# Patient Record
Sex: FEMALE | Race: WHITE | NOT HISPANIC OR LATINO | URBAN - METROPOLITAN AREA
[De-identification: names, ages, dates, MRNs, and addresses within clinical notes are randomized per-mention and may not be internally consistent; named-entity substitution may affect disease eponyms.]

---

## 2023-09-23 PROCEDURE — 99284 EMERGENCY DEPT VISIT MOD MDM: CPT

## 2023-09-24 ENCOUNTER — HOSPITAL ENCOUNTER (EMERGENCY)
Facility: HOSPITAL | Age: 38
Discharge: HOME | End: 2023-09-24
Attending: EMERGENCY MEDICINE
Payer: MEDICAID

## 2023-09-24 VITALS
RESPIRATION RATE: 18 BRPM | DIASTOLIC BLOOD PRESSURE: 85 MMHG | HEART RATE: 101 BPM | TEMPERATURE: 99 F | OXYGEN SATURATION: 97 % | SYSTOLIC BLOOD PRESSURE: 138 MMHG

## 2023-09-24 DIAGNOSIS — L03.114 CELLULITIS OF LEFT UPPER EXTREMITY: Primary | ICD-10-CM

## 2023-09-24 DIAGNOSIS — R00.0 TACHYCARDIA: ICD-10-CM

## 2023-09-24 PROCEDURE — 93010 ELECTROCARDIOGRAM REPORT: CPT | Mod: ,,, | Performed by: INTERNAL MEDICINE

## 2023-09-24 PROCEDURE — 93010 EKG 12-LEAD: ICD-10-PCS | Mod: ,,, | Performed by: INTERNAL MEDICINE

## 2023-09-24 PROCEDURE — 93005 ELECTROCARDIOGRAM TRACING: CPT

## 2023-09-24 PROCEDURE — 25000003 PHARM REV CODE 250: Performed by: EMERGENCY MEDICINE

## 2023-09-24 RX ORDER — BACITRACIN ZINC 500 [USP'U]/G
OINTMENT TOPICAL
Status: COMPLETED | OUTPATIENT
Start: 2023-09-24 | End: 2023-09-24

## 2023-09-24 RX ORDER — IBUPROFEN 600 MG/1
600 TABLET ORAL
Status: COMPLETED | OUTPATIENT
Start: 2023-09-24 | End: 2023-09-24

## 2023-09-24 RX ORDER — SULFAMETHOXAZOLE AND TRIMETHOPRIM 800; 160 MG/1; MG/1
1 TABLET ORAL
Status: COMPLETED | OUTPATIENT
Start: 2023-09-24 | End: 2023-09-24

## 2023-09-24 RX ORDER — CEPHALEXIN 500 MG/1
500 CAPSULE ORAL
Status: COMPLETED | OUTPATIENT
Start: 2023-09-24 | End: 2023-09-24

## 2023-09-24 RX ORDER — SULFAMETHOXAZOLE AND TRIMETHOPRIM 800; 160 MG/1; MG/1
1 TABLET ORAL 2 TIMES DAILY
Qty: 14 TABLET | Refills: 0 | Status: SHIPPED | OUTPATIENT
Start: 2023-09-24 | End: 2023-10-04

## 2023-09-24 RX ORDER — CEPHALEXIN 500 MG/1
500 CAPSULE ORAL EVERY 6 HOURS
Qty: 40 CAPSULE | Refills: 0 | Status: SHIPPED | OUTPATIENT
Start: 2023-09-24 | End: 2023-10-04

## 2023-09-24 RX ADMIN — SULFAMETHOXAZOLE AND TRIMETHOPRIM 1 TABLET: 800; 160 TABLET ORAL at 12:09

## 2023-09-24 RX ADMIN — CEPHALEXIN 500 MG: 500 CAPSULE ORAL at 12:09

## 2023-09-24 RX ADMIN — Medication 1 EACH: at 12:09

## 2023-09-24 RX ADMIN — IBUPROFEN 600 MG: 600 TABLET ORAL at 12:09

## 2023-09-24 NOTE — ED NOTES
"Bebe Allen, a 38 y.o. female presents to the ED w/ complaint of lleft arm pain/swelling. Reports her house cat bit her there 1 week ago and it's been growing in redness/pain.swelling since. Today she "popped it" and it started draining blood and pus. Denies fever. Denies hx of similar symptoms. HIV+    Patient reports she was diagnosed with scabies last week due to a rash on her hands but is not taking the medication, doesn't think she has scabies.     Triage note:  Chief Complaint   Patient presents with    Abscess     Abscess to L arm x1 week that burst today. States it is from a cat bite. Possible scabies infection, states went to  for rash and was Dx with scabies, has not been taking medication for it because pt states "I do not have scabies."     Review of patient's allergies indicates:   Allergen Reactions    Penicillins      Past Medical History:   Diagnosis Date    Herpes     HIV disease            LOC: The patient is awake, alert, and oriented to self, place, time, and situation. Pt is calm and cooperative. Affect is anxious. 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    APPEARANCE: Patient resting comfortably in no acute distress.  Patient is somewhat disheveled.    SKIN: Left radial elbow with area of swelling, erythema, raised skin 2x2in. In the center there is an ulcerated region with reddish pink skin and purulence. The floor next to the patient has several large drops of blood, patient reports she just tried to "pop" the lesion. Otherwise, the visualized skin is warm and dry; color consistent with ethnicity.  Patient has normal skin turgor and moist mucus membranes.  Skin intact; no breakdown or bruising noted.     MUSCULOSKELETAL: Patient moving upper and lower extremities without difficulty; denies pain in the extremities or back.  Denies weakness.     RESPIRATORY: Airway is open and " patent. Respirations spontaneous, even, easy, and non-labored.  No audible wheeze. Patient has a normal effort and rate.  No accessory muscle use noted. Denies cough.     CARDIAC:  Tachycardia noted.  No peripheral edema noted. 2+ radial pulses bilaterally. No complaints of chest pain.      ABDOMEN: Supple.  No distention noted. Pt denies abdominal pain; denies nausea, vomiting, diarrhea, or constipation.    NEUROLOGIC: Eyes open spontaneously.  Behavior somewhat odd.  Follows commands; facial expression symmetrical.  Purposeful motor response noted; normal sensation in all extremities. Pt denies headache; denies lightheadedness or dizziness; denies visual disturbances; denies loss of balance; denies unilateral weakness.

## 2023-09-24 NOTE — Clinical Note
"Bebe Fuentes"Alejandro was seen and treated in our emergency department on 9/23/2023.  She may return to school on 09/25/2023.      If you have any questions or concerns, please don't hesitate to call.      Christen Gupta RN"

## 2023-09-24 NOTE — ED PROVIDER NOTES
"History:  Bebe Allen is a 38 y.o. female who presents to the ED with Abscess (Abscess to L arm x1 week that burst today. States it is from a cat bite. Possible scabies infection, states went to  for rash and was Dx with scabies, has not been taking medication for it because pt states "I do not have scabies.")    Described as 38-year-old female with a history of HIV, depression, anxiety, opioid dependence presenting to the emergency department with the wound.  She reports she was seen for the same at urgent care but was not given antibiotics.  She believed it to be an abscess, and she used a razor blade at home to cut it open.  She reports that it did not drain, only blood, until she stuck a needle in it, and reports it drain a significant amount of yellow pus.  She reports it then felt better until today when she began to have increased redness around the wound with increased pain.  Regarding her cat bite, she had a cat bite on her opposite arm a week ago, took Keflex, and has no issues from that.  She also reports that she had 1 bump on her webspace in urgent care told her she had scabies.    Review of Systems: All systems reviewed and are negative except as per history of present illness.    Medications:   Previous Medications    ACID REDUCER, FAMOTIDINE, 10 MG TABLET    Take 10 mg by mouth 2 (two) times daily.    AZITHROMYCIN (Z-KASIE) 250 MG TABLET    Take 1 tablet (250 mg total) by mouth once daily. 1 pack, take as directed    AZITHROMYCIN (Z-KASIE) 250 MG TABLET    Take 1 tablet (250 mg total) by mouth once daily. 1 pack, take as directed    AZITHROMYCIN (Z-KASIE) 250 MG TABLET    TAKE BY MOUTH AS DIRECTED    AZITHROMYCIN (Z-KASIE) 250 MG TABLET    TAKE 2 TABLETS BY MOUTH FOR 1 DAY THEN TAKE 1 TABLET BY MOUTH DAILY FOR 4 DAYS    BUPRENORPHINE-NALOXONE (SUBOXONE) 12-3 MG FILM    DISSOLVE 1 FILM UNDER THE TONGUE TWICE DAILY    BUPROPION (WELLBUTRIN XL) 150 MG TB24 TABLET    Take 1 tablet (150 mg total) by " mouth once daily.    BUPROPION (WELLBUTRIN XL) 150 MG TB24 TABLET    TAKE 1 TABLET BY MOUTH EVERY DAY    BUPROPION (WELLBUTRIN XL) 150 MG TB24 TABLET    Take 1 tablet (150 mg total) by mouth once daily.    BUPROPION (WELLBUTRIN XL) 300 MG 24 HR TABLET    TAKE 1 TABLET BY MOUTH EVERY DAY    BUPROPION (WELLBUTRIN XL) 300 MG 24 HR TABLET    Take 1 tablet (300 mg total) by mouth once daily.    BUPROPION (WELLBUTRIN XL) 300 MG 24 HR TABLET    Take 1 tablet (300 mg total) by mouth once daily.    BUPROPION (WELLBUTRIN XL) 300 MG 24 HR TABLET    Take 1 tablet (300 mg total) by mouth once daily.    BUPROPION (WELLBUTRIN XL) 300 MG 24 HR TABLET    Take 1 tablet (300 mg total) by mouth once daily.    CLONIDINE (CATAPRES) 0.1 MG TABLET    Take 1 tablet (0.1 mg total) by mouth 2 (two) times daily.    CONCEPT DHA 35-1-200 MG CAP    Take 1 capsule by mouth once daily.    CYCLOBENZAPRINE (FLEXERIL) 10 MG TABLET    Take 1 tablet (10 mg total) by mouth every evening.    DIAZEPAM (VALIUM) 10 MG TAB    Take 1 tablet (10 mg total) by mouth every 12 (twelve) hours as needed for Anxiety.    DOXYCYCLINE (VIBRA-TABS) 100 MG TABLET        FLUCONAZOLE (DIFLUCAN) 150 MG TAB    take 1 tablet by mouth as a single dose    FLUOXETINE (PROZAC) 20 MG CAPSULE    Take 20 mg by mouth once daily.    FLUTICASONE (FLONASE) 50 MCG/ACTUATION NASAL SPRAY    1 spray by Each Nare route once daily.    GABAPENTIN (NEURONTIN) 100 MG CAPSULE    TAKE 1 CAPSULE(100 MG) BY MOUTH THREE TIMES DAILY    GABAPENTIN (NEURONTIN) 400 MG CAPSULE    TAKE 1 CAPSULE(400 MG) BY MOUTH THREE TIMES DAILY    HYDROCORTISONE 2.5 % OINTMENT    Apply topically 2 (two) times daily. To affected area    HYDROXYZINE (VISTARIL) 100 MG CAPSULE    TAKE 1 CAPSULE BY MOUTH 2 TIMES A DAY AS NEEDED FOR ITCHING    IVERMECTIN 1 % CREA    Apply 1 application topically once daily at 6am.    KALETRA 200-50 MG TAB        LAMIVUDINE-ZIDOVUDINE 150-300MG (COMBIVIR) 150-300 MG TAB        LORATADINE  (CLARITIN) 10 MG TABLET    Take 1 tablet (10 mg total) by mouth once daily.    METRONIDAZOLE (FLAGYL) 500 MG TABLET        MUPIROCIN (BACTROBAN) 2 % OINTMENT    Apply topically 2 (two) times daily. AAA    NORVIR 100 MG TAB TABLET    Take 100 mg by mouth once daily.    ONDANSETRON (ZOFRAN) 4 MG TABLET    Take 4 mg by mouth 3 (three) times daily.    PRENAISSANCE 90 DHA 90 MG IRON-1 MG -50 MG-300 MG CMPK        PROMETHAZINE (PHENERGAN) 25 MG TABLET        PROMETHAZINE (PHENERGAN) 25 MG TABLET    Take 1 tablet (25 mg total) by mouth every 8 (eight) hours as needed for Nausea.    PROMETHAZINE (PHENERGAN) 6.25 MG/5 ML SYRUP    take 20 milliliters (4 teaspoonfuls) by mouth once daily    RANITIDINE (ZANTAC) 300 MG TABLET    Take 1 tablet (300 mg total) by mouth 2 (two) times daily.    REYATAZ 300 MG CAP        SERTRALINE (ZOLOFT) 50 MG TABLET        TRUVADA 200-300 MG TAB        VALACYCLOVIR (VALTREX) 1000 MG TABLET    Take 1,000 mg by mouth once daily.    VALACYCLOVIR (VALTREX) 500 MG TABLET    Take 500 mg by mouth 2 (two) times daily.       PMH:   Past Medical History:   Diagnosis Date    Herpes     HIV disease      PSH: No past surgical history on file.  Allergies: She is allergic to penicillins.  Social History: Marital Status: single. She  reports that she has been smoking. She does not have any smokeless tobacco history on file.. She  reports no history of alcohol use..       Exam:  VITAL SIGNS:   Vitals:    09/23/23 2304   BP: 132/82   BP Location: Right arm   Patient Position: Sitting   Pulse: (!) 117   Resp: 18   Temp: 97.5 °F (36.4 °C)   TempSrc: Oral   SpO2: 97%     Const: Awake and alert, NAD   Head: Atraumatic  Eyes: Normal Conjunctiva  ENT: Normal External Ears, Nose and Mouth.  Neck: Full range of motion. No meningismus.  Resp: Normal respiratory effort, No distress  Cardio: Equal and intact distal pulses  Skin:  Erythema and warmth surrounding a 1 cm shallow ulceration on left proximal forearm.  No  induration or fluctuance, no active drainage.  Slight oozing blood from her shallow skin around the wound  Ext: No cyanosis, or edema.  Full range of motion at elbow  Neur: Awake and alert  Psych:  Anxious appearing    Medical Decision Makin-year-old female presenting to the emergency department with a wound.  She does have a cellulitis surrounding what was likely an abscess that she drained by herself at home.  She was encouraged to continue warm compresses to continue to encourage drainage if need be.  There is no signs of abscess on examination today.  She was no systemic symptoms.  She will be prescribed Keflex and Bactrim for her cellulitis.  Her tachycardia significantly improved, though she remained anxious and subsequently was upset regarding a different personal issue prior to discharge.  She does not meet criteria for pec at this time.  She was encouraged to keep the wound clean, dry, and covered, bacitracin as needed, follow up with PCP for wound check in 2 days.    Clinical Impression:  1. Cellulitis of left upper extremity    2. Tachycardia             Medication List        CHANGE how you take these medications      cephALEXin 500 MG capsule  Commonly known as: KEFLEX  Take 1 capsule (500 mg total) by mouth every 6 (six) hours. for 10 days  What changed:   how much to take  how to take this  when to take this     sulfamethoxazole-trimethoprim 800-160mg 800-160 mg Tab  Commonly known as: BACTRIM DS  Take 1 tablet by mouth 2 (two) times daily. for 10 days  What changed: when to take this            ASK your doctor about these medications      ACID REDUCER (FAMOTIDINE) 10 MG tablet  Generic drug: famotidine     * azithromycin 250 MG tablet  Commonly known as: Z-KASIE  Take 1 tablet (250 mg total) by mouth once daily. 1 pack, take as directed     * azithromycin 250 MG tablet  Commonly known as: Z-KASIE  Take 1 tablet (250 mg total) by mouth once daily. 1 pack, take as directed     * azithromycin 250 MG  tablet  Commonly known as: Z-KASIE  TAKE BY MOUTH AS DIRECTED     * azithromycin 250 MG tablet  Commonly known as: Z-KASIE  TAKE 2 TABLETS BY MOUTH FOR 1 DAY THEN TAKE 1 TABLET BY MOUTH DAILY FOR 4 DAYS     buprenorphine-naloxone 12-3 mg Film  Commonly known as: SUBOXONE  DISSOLVE 1 FILM UNDER THE TONGUE TWICE DAILY     * buPROPion 150 MG TB24 tablet  Commonly known as: WELLBUTRIN XL  Take 1 tablet (150 mg total) by mouth once daily.     * buPROPion 150 MG TB24 tablet  Commonly known as: WELLBUTRIN XL  TAKE 1 TABLET BY MOUTH EVERY DAY     * buPROPion 300 MG 24 hr tablet  Commonly known as: WELLBUTRIN XL  TAKE 1 TABLET BY MOUTH EVERY DAY     * buPROPion 150 MG TB24 tablet  Commonly known as: WELLBUTRIN XL  Take 1 tablet (150 mg total) by mouth once daily.     * buPROPion 300 MG 24 hr tablet  Commonly known as: WELLBUTRIN XL  Take 1 tablet (300 mg total) by mouth once daily.     * buPROPion 300 MG 24 hr tablet  Commonly known as: WELLBUTRIN XL  Take 1 tablet (300 mg total) by mouth once daily.     * buPROPion 300 MG 24 hr tablet  Commonly known as: WELLBUTRIN XL  Take 1 tablet (300 mg total) by mouth once daily.     * buPROPion 300 MG 24 hr tablet  Commonly known as: WELLBUTRIN XL  Take 1 tablet (300 mg total) by mouth once daily.     cloNIDine 0.1 MG tablet  Commonly known as: CATAPRES  Take 1 tablet (0.1 mg total) by mouth 2 (two) times daily.     CONCEPT DHA 35-1-200 mg Cap  Generic drug: [id number] [id number]     cyclobenzaprine 10 MG tablet  Commonly known as: FLEXERIL  Take 1 tablet (10 mg total) by mouth every evening.     diazePAM 10 MG Tab  Commonly known as: VALIUM  Take 1 tablet (10 mg total) by mouth every 12 (twelve) hours as needed for Anxiety.     doxycycline 100 MG tablet  Commonly known as: VIBRA-TABS     fluconazole 150 MG Tab  Commonly known as: DIFLUCAN     FLUoxetine 20 MG capsule     fluticasone propionate 50 mcg/actuation nasal spray  Commonly known as: FLONASE  1 spray by Each Nare route  once daily.     * gabapentin 100 MG capsule  Commonly known as: NEURONTIN  TAKE 1 CAPSULE(100 MG) BY MOUTH THREE TIMES DAILY     * gabapentin 400 MG capsule  Commonly known as: NEURONTIN  TAKE 1 CAPSULE(400 MG) BY MOUTH THREE TIMES DAILY     hydrocortisone 2.5 % ointment  Apply topically 2 (two) times daily. To affected area     hydrOXYzine 100 MG capsule  Commonly known as: VISTARIL  TAKE 1 CAPSULE BY MOUTH 2 TIMES A DAY AS NEEDED FOR ITCHING     ivermectin 1 % Crea  Commonly known as: SOOLANTRA  Apply 1 application topically once daily at 6am.     KALETRA 200-50 mg Tab  Generic drug: lopinavir-ritonavir 200-50 mg     lamivudine-zidovudine 150-300mg 150-300 mg Tab  Commonly known as: CombiVIR     loratadine 10 mg tablet  Commonly known as: CLARITIN  Take 1 tablet (10 mg total) by mouth once daily.     metroNIDAZOLE 500 MG tablet  Commonly known as: FLAGYL     mupirocin 2 % ointment  Commonly known as: BACTROBAN  Apply topically 2 (two) times daily. AAA     NORVIR 100 mg Tab tablet  Generic drug: ritonavir     ondansetron 4 MG tablet  Commonly known as: ZOFRAN     PRENAISSANCE 90 DHA 90 mg iron-1 mg -50 mg-300 mg Cmpk  Generic drug: [id number]-iron cbn,[id number]-FA-[id number]-dha     * promethazine 25 MG tablet  Commonly known as: PHENERGAN     * promethazine 25 MG tablet  Commonly known as: PHENERGAN  Take 1 tablet (25 mg total) by mouth every 8 (eight) hours as needed for Nausea.     * promethazine 6.25 mg/5 mL syrup  Commonly known as: PHENERGAN  take 20 milliliters (4 teaspoonfuls) by mouth once daily     ranitidine 300 MG tablet  Commonly known as: ZANTAC  Take 1 tablet (300 mg total) by mouth 2 (two) times daily.     REYATAZ 300 MG Cap  Generic drug: atazanavir     sertraline 50 MG tablet  Commonly known as: ZOLOFT     TRUVADA 200-300 mg Tab  Generic drug: emtricitabine-tenofovir 200-300 mg     * valACYclovir 500 MG tablet  Commonly known as: VALTREX     * valACYclovir 1000 MG tablet  Commonly known as: VALTREX           *  This list has 19 medication(s) that are the same as other medications prescribed for you. Read the directions carefully, and ask your doctor or other care provider to review them with you.                   Where to Get Your Medications        These medications were sent to [id number] - [place] - [address]  AT [facility] [address]  [address] [place] [address]      Hours: 24-hours Phone: [phone]   cephALEXin 500 MG capsule  sulfamethoxazole-trimethoprim 800-160mg 800-160 mg Tab         Follow-up Information       Lefty Garcia MD. Schedule an appointment as soon as possible for a visit in 2 days.    Specialty: Family Medicine  Why: For wound re-check  Contact information:  [address] [facility] [place] [address] [phone]                               Modesta Contreras MD  09/24/23 0053

## 2024-05-07 ENCOUNTER — OFFICE VISIT (OUTPATIENT)
Dept: URGENT CARE | Facility: CLINIC | Age: 39
End: 2024-05-07
Payer: MEDICAID

## 2024-05-07 VITALS
HEART RATE: 80 BPM | HEIGHT: 66 IN | TEMPERATURE: 98 F | SYSTOLIC BLOOD PRESSURE: 129 MMHG | BODY MASS INDEX: 32.14 KG/M2 | OXYGEN SATURATION: 98 % | RESPIRATION RATE: 20 BRPM | DIASTOLIC BLOOD PRESSURE: 84 MMHG | WEIGHT: 200 LBS

## 2024-05-07 DIAGNOSIS — R05.9 COUGH, UNSPECIFIED TYPE: ICD-10-CM

## 2024-05-07 DIAGNOSIS — J02.9 SORE THROAT: ICD-10-CM

## 2024-05-07 DIAGNOSIS — J06.9 VIRAL URI: Primary | ICD-10-CM

## 2024-05-07 LAB
CTP QC/QA: YES
CTP QC/QA: YES
MOLECULAR STREP A: NEGATIVE
SARS-COV-2 AG RESP QL IA.RAPID: NEGATIVE

## 2024-05-07 PROCEDURE — 87811 SARS-COV-2 COVID19 W/OPTIC: CPT | Mod: QW,S$GLB,,

## 2024-05-07 PROCEDURE — 87651 STREP A DNA AMP PROBE: CPT | Mod: QW,S$GLB,,

## 2024-05-07 PROCEDURE — 99214 OFFICE O/P EST MOD 30 MIN: CPT | Mod: S$GLB,,,

## 2024-05-07 RX ORDER — FLUTICASONE PROPIONATE 50 MCG
1 SPRAY, SUSPENSION (ML) NASAL 2 TIMES DAILY
Qty: 16 G | Refills: 0 | Status: SHIPPED | OUTPATIENT
Start: 2024-05-07 | End: 2024-05-17

## 2024-05-07 RX ORDER — CETIRIZINE HYDROCHLORIDE 10 MG/1
10 TABLET ORAL DAILY
Qty: 30 TABLET | Refills: 0 | Status: SHIPPED | OUTPATIENT
Start: 2024-05-07 | End: 2024-06-06

## 2024-05-07 RX ORDER — BENZONATATE 200 MG/1
200 CAPSULE ORAL 3 TIMES DAILY PRN
Qty: 30 CAPSULE | Refills: 0 | Status: SHIPPED | OUTPATIENT
Start: 2024-05-07 | End: 2024-05-17

## 2024-05-07 RX ORDER — PROMETHAZINE HYDROCHLORIDE AND DEXTROMETHORPHAN HYDROBROMIDE 6.25; 15 MG/5ML; MG/5ML
5 SYRUP ORAL EVERY 4 HOURS PRN
Qty: 180 ML | Refills: 0 | Status: SHIPPED | OUTPATIENT
Start: 2024-05-07 | End: 2024-05-17

## 2024-05-07 RX ORDER — IPRATROPIUM BROMIDE 21 UG/1
1 SPRAY, METERED NASAL 2 TIMES DAILY
Qty: 30 ML | Refills: 0 | Status: SHIPPED | OUTPATIENT
Start: 2024-05-07 | End: 2024-05-17

## 2024-05-07 NOTE — LETTER
May 7, 2024      [facility] - [address]  [place] [address]  Phone: [phone]  Fax: [phone]       Patient: Bebe Allen   YOB: 1985  Date of Visit: 05/07/2024    To Whom It May Concern:    Heeln Allen  was at [facility] on 05/07/2024. The patient may return to work/school on 5/8/24 with no restrictions. If you have any questions or concerns, or if I can be of further assistance, please do not hesitate to contact me.    Sincerely,    Lisandra Beal PA-C

## 2024-05-07 NOTE — PATIENT INSTRUCTIONS
Please drink plenty of fluids.  Please get plenty of rest.  Please return here or go to the Emergency Department for any concerns or worsening of condition.  Use flonase and/or atrovent nasal spray twice daily and take daily zyrtec as directed for ten days. Use tessalon perles during the day and promethazine-dm at night for cough. Recommended taking vitamin D and zinc supplements for immune support.   Recommended for patient to drink hot tea with honey. Consider eating softer foods such as soup and broth for the next couple of days to prevent further throat irritation. Recommended for patient to refrain from acidic foods (such as tomatoes or caffeine) to prevent throat irritation for the next couple of days.   If you do not have Hypertension or any history of palpitations, it is ok to take over the counter Sudafed or Mucinex D or Allegra-D or Claritin-D or Zyrtec-D.  If you do take one of the above, it is ok to combine that with plain over the counter Mucinex or Allegra or Claritin or Zyrtec.  If for example you are taking Zyrtec -D, you can combine that with Mucinex, but not Mucinex-D.  If you are taking Mucinex-D, you can combine that with plain Allegra or Claritin or Zyrtec.   If you do have Hypertension or palpitations, it is safe to take Coricidin HBP for relief of sinus symptoms.  If not allergic, please take over the counter Tylenol (Acetaminophen) and/or Motrin (Ibuprofen) as directed for control of pain and/or fever.  Please follow up with your primary care doctor or specialist as needed.    If you  smoke, please stop smoking.

## 2024-05-07 NOTE — PROGRESS NOTES
"Subjective:      Patient ID: Bebe Allen is a 39 y.o. female.    Vitals:  height is 5' 6" (1.676 m) and weight is 90.7 kg (200 lb). Her oral temperature is 98 °F (36.7 °C). Her blood pressure is 129/84 and her pulse is 80. Her respiration is 20 and oxygen saturation is 98%.     Chief Complaint: Cough and Sore Throat    [age]-year-old female presents to the clinic today with chief complaint of sore throat, postnasal drip, dry cough, subclinical fever, body aches, chills, sweats. Symptoms started 2 days ago and have not improved.  Patient has not taken any medication for symptom relief.  Patient does have a history of HIV.  She states her daughter did have strep 2 weeks ago.  Denies any recent travel.  She does have a history of seasonal allergies. Denies hx of asthma. Denies numbness or tingling. Denies radiation of pain. Denies chest pain, shortness of breath, wheezing, abdominal pain, nausea, vomiting, diarrhea, or rashes.            Cough  This is a new problem. The current episode started in the past 7 days. The problem has been gradually worsening. Associated symptoms include chills, a fever, myalgias, nasal congestion and postnasal drip. Pertinent negatives include no chest pain, ear pain, eye redness, headaches, heartburn, rash, sore throat, shortness of breath or wheezing. She has tried nothing for the symptoms. There is no history of environmental allergies.       Constitution: Positive for chills, sweating and fever. Negative for activity change, appetite change, fatigue, generalized weakness and international travel in last 60 days.   HENT:  Positive for postnasal drip. Negative for ear pain, ear discharge, tinnitus, hearing loss, congestion, nosebleeds, foreign body in nose, sinus pain, sinus pressure, sore throat, trouble swallowing and voice change.    Neck: Negative for neck pain, neck stiffness and neck swelling.   Cardiovascular:  Negative for chest pain, leg swelling, palpitations and " sob on exertion.   Eyes:  Negative for eye discharge, eye itching, eye pain, eye redness, photophobia, vision loss, double vision and blurred vision.   Respiratory:  Positive for cough. Negative for chest tightness, sputum production, shortness of breath, wheezing and asthma.    Gastrointestinal:  Negative for abdominal pain, nausea, vomiting, constipation, diarrhea, bright red blood in stool and heartburn.   Endocrine: cold intolerance and heat intolerance.   Genitourinary:  Negative for dysuria, frequency, urgency, urine decreased, flank pain and hematuria.   Musculoskeletal:  Positive for muscle ache. Negative for pain, joint pain, joint swelling and back pain.   Skin:  Negative for rash, erythema, bruising and hives.   Allergic/Immunologic: Negative for environmental allergies, seasonal allergies, food allergies, eczema, asthma, hives, itching and sneezing.   Neurological:  Negative for dizziness, light-headedness, headaches, disorientation and altered mental status.   Psychiatric/Behavioral:  Negative for altered mental status, disorientation, confusion, agitation and nervous/anxious. The patient is not nervous/anxious.       Objective:     Physical Exam   Constitutional: She is oriented to person, place, and time. She appears well-developed. She is cooperative.  Non-toxic appearance. She does not appear ill. No distress.   HENT:   Head: Normocephalic and atraumatic.   Ears:   Right Ear: Hearing, external ear and ear canal normal. A middle ear effusion is present.   Left Ear: Hearing, external ear and ear canal normal. A middle ear effusion is present.   Nose: Mucosal edema present. No rhinorrhea, purulent discharge or nasal deformity. No epistaxis. Right sinus exhibits no maxillary sinus tenderness and no frontal sinus tenderness. Left sinus exhibits no maxillary sinus tenderness and no frontal sinus tenderness.   Mouth/Throat: Uvula is midline, oropharynx is clear and moist and mucous membranes are normal. No  trismus in the jaw. Normal dentition. No uvula swelling. Cobblestoning present. No oropharyngeal exudate, posterior oropharyngeal edema, posterior oropharyngeal erythema or tonsillar abscesses.   Eyes: Conjunctivae and lids are normal. No scleral icterus. Extraocular movement intact   Neck: Trachea normal and phonation normal. Neck supple. No edema present. No erythema present. No neck rigidity present.   Cardiovascular: Normal rate, regular rhythm, normal heart sounds and normal pulses.   Pulmonary/Chest: Effort normal and breath sounds normal. No stridor. No respiratory distress. She has no decreased breath sounds. She has no wheezes. She has no rhonchi. She has no rales.   Abdominal: Normal appearance.   Musculoskeletal: Normal range of motion.         General: No deformity. Normal range of motion.   Lymphadenopathy:     She has cervical adenopathy.   Neurological: She is alert and oriented to person, place, and time. She exhibits normal muscle tone. Coordination normal.   Skin: Skin is warm, dry, intact, not diaphoretic and not pale. No erythema   Psychiatric: Her speech is normal and behavior is normal. Judgment and thought content normal.   Nursing note and vitals reviewed.      Assessment:     1. Viral URI    2. Cough, unspecified type    3. Sore throat      Results for orders placed or performed in visit on 05/07/24   SARS Coronavirus 2 Antigen, POCT Manual Read   Result Value Ref Range    SARS Coronavirus 2 Antigen Negative Negative     Acceptable Yes    POCT Strep A, Molecular   Result Value Ref Range    Molecular Strep A, POC Negative Negative     Acceptable Yes        Plan:       Viral URI  -     ipratropium (ATROVENT) 21 mcg (0.03 %) nasal spray; 1 spray by Each Nostril route 2 (two) times daily. for 10 days  Dispense: 30 mL; Refill: 0  -     fluticasone propionate (FLONASE) 50 mcg/actuation nasal spray; 1 spray (50 mcg total) by Each Nostril route 2 (two) times daily. for  10 days  Dispense: 16 g; Refill: 0  -     cetirizine (ZYRTEC) 10 MG tablet; Take 1 tablet (10 mg total) by mouth once daily.  Dispense: 30 tablet; Refill: 0  -     benzonatate (TESSALON) 200 MG capsule; Take 1 capsule (200 mg total) by mouth 3 (three) times daily as needed for Cough.  Dispense: 30 capsule; Refill: 0  -     promethazine-dextromethorphan (PROMETHAZINE-DM) 6.25-15 mg/5 mL Syrp; Take 5 mLs by mouth every 4 (four) hours as needed (as needed).  Dispense: 180 mL; Refill: 0    Cough, unspecified type  -     SARS Coronavirus 2 Antigen, POCT Manual Read  -     POCT Strep A, Molecular  -     benzonatate (TESSALON) 200 MG capsule; Take 1 capsule (200 mg total) by mouth 3 (three) times daily as needed for Cough.  Dispense: 30 capsule; Refill: 0  -     promethazine-dextromethorphan (PROMETHAZINE-DM) 6.25-15 mg/5 mL Syrp; Take 5 mLs by mouth every 4 (four) hours as needed (as needed).  Dispense: 180 mL; Refill: 0    Sore throat  -     POCT Strep A, Molecular      We had shared decision making for patient's treatment. We discussed side effects/alternatives/benefits/risk and patient would like to proceed with treatment plan. We also discussed other OTC treatment recommendations. Patient was counseled, explained with the test results meaning, expected course, and answered all of questions. Patient can take OTC Acetaminophen (Tylenol) and/or Ibuprofen (Motrin) as needed for pain relief and/or fever relief. Continue to drink plenty of fluids. Follow up with PCP in the next 1-2 weeks as needed.  Gave patient strict ER/urgent care precautions in case symptoms worsen or if any new concerns arise.

## 2024-05-20 ENCOUNTER — OFFICE VISIT (OUTPATIENT)
Dept: URGENT CARE | Facility: CLINIC | Age: 39
End: 2024-05-20
Payer: MEDICAID

## 2024-05-20 VITALS
BODY MASS INDEX: 32.14 KG/M2 | HEIGHT: 66 IN | WEIGHT: 200 LBS | RESPIRATION RATE: 20 BRPM | OXYGEN SATURATION: 98 % | HEART RATE: 98 BPM | DIASTOLIC BLOOD PRESSURE: 71 MMHG | TEMPERATURE: 99 F | SYSTOLIC BLOOD PRESSURE: 118 MMHG
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
## 2024-05-20 DIAGNOSIS — S00.12XA: Primary | ICD-10-CM

## 2024-05-20 PROCEDURE — 99213 OFFICE O/P EST LOW 20 MIN: CPT | Mod: S$GLB,,, | Performed by: NURSE PRACTITIONER

## 2024-05-20 RX ORDER — DEXTROAMPHETAMINE SACCHARATE, AMPHETAMINE ASPARTATE, DEXTROAMPHETAMINE SULFATE AND AMPHETAMINE SULFATE 5; 5; 5; 5 MG/1; MG/1; MG/1; MG/1
1 TABLET ORAL
COMMUNITY
Start: 2024-05-17

## 2024-05-20 RX ORDER — DEXTROAMPHETAMINE SACCHARATE, AMPHETAMINE ASPARTATE, DEXTROAMPHETAMINE SULFATE AND AMPHETAMINE SULFATE 7.5; 7.5; 7.5; 7.5 MG/1; MG/1; MG/1; MG/1
1 TABLET ORAL 2 TIMES DAILY
COMMUNITY
Start: 2024-05-16

## 2024-05-20 RX ORDER — KETOROLAC TROMETHAMINE 30 MG/ML
30 INJECTION, SOLUTION INTRAMUSCULAR; INTRAVENOUS
Status: COMPLETED | OUTPATIENT
Start: 2024-05-20 | End: 2024-05-20

## 2024-05-20 RX ADMIN — KETOROLAC TROMETHAMINE 30 MG: 30 INJECTION, SOLUTION INTRAMUSCULAR; INTRAVENOUS at 05:05

## 2024-05-20 NOTE — PATIENT INSTRUCTIONS
DO NOT TAKE ANY MORE NAPROXEN OR IBUPROFEN FOR 24 HOURS AS YOU RECEIVED A LARGE DOSE OF IT VIA INJECTION     If your condition worsens or fails to improve we recommend that you receive another evaluation at the ER immediately or contact your PCP to discuss your concerns or return here. You must understand that you've received an urgent care treatment only and that you may be released before all your medical problems are known or treated. You the patient will arrange for followup care as instructed.    If you were prescribed antibiotics, please take them to completion.  If you were prescribed a narcotic medication, do not drive or operate heavy equipment or machinery while taking these medications.  Please follow up with your primary care doctor or specialist as needed.  If you  smoke, please stop smoking.

## 2024-05-20 NOTE — LETTER
May 20, 2024      [facility] [address]  [place] [address]  Phone: [phone]  Fax: [phone]       Patient: Bebe Allen   YOB: 1985  Date of Visit: 05/20/2024    To Whom It May Concern:    Helen Allen  was at [facility] on 05/20/2024. The patient may return to work/school on 05/21/2024 with no restrictions. If you have any questions or concerns, or if I can be of further assistance, please do not hesitate to contact me.    Sincerely,    Chiquis Ferreira MA
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
## 2025-02-27 ENCOUNTER — HOSPITAL ENCOUNTER (EMERGENCY)
Facility: HOSPITAL | Age: 40
Discharge: HOME | End: 2025-02-27
Attending: EMERGENCY MEDICINE
Payer: COMMERCIAL

## 2025-02-27 VITALS
HEIGHT: 66 IN | HEART RATE: 97 BPM | OXYGEN SATURATION: 98 % | BODY MASS INDEX: 26.52 KG/M2 | SYSTOLIC BLOOD PRESSURE: 124 MMHG | WEIGHT: 165 LBS | RESPIRATION RATE: 18 BRPM | DIASTOLIC BLOOD PRESSURE: 96 MMHG
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
## 2025-02-27 PROCEDURE — 99283 EMERGENCY DEPT VISIT LOW MDM: CPT
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
## 2025-02-28 NOTE — DISCHARGE INSTRUCTIONS
Please follow-up with a primary care physician for further evaluation and management.  Please return at any time if you wish to resume your evaluation and management in the emergency department.

## 2025-05-31 ENCOUNTER — HOSPITAL ENCOUNTER (EMERGENCY)
Facility: HOSPITAL | Age: 40
Discharge: HOME | End: 2025-05-31
Attending: EMERGENCY MEDICINE
Payer: MEDICAID

## 2025-05-31 VITALS
RESPIRATION RATE: 20 BRPM | WEIGHT: 150 LBS | DIASTOLIC BLOOD PRESSURE: 76 MMHG | BODY MASS INDEX: 23.54 KG/M2 | HEIGHT: 67 IN | OXYGEN SATURATION: 97 % | TEMPERATURE: 98 F | HEART RATE: 82 BPM | SYSTOLIC BLOOD PRESSURE: 122 MMHG

## 2025-05-31 DIAGNOSIS — S20.211A CHEST WALL CONTUSION, RIGHT, INITIAL ENCOUNTER: ICD-10-CM

## 2025-05-31 DIAGNOSIS — H05.232 PERIORBITAL HEMATOMA OF LEFT EYE: ICD-10-CM

## 2025-05-31 DIAGNOSIS — R07.89 CHEST DISCOMFORT: ICD-10-CM

## 2025-05-31 DIAGNOSIS — S00.83XA CONTUSION OF FACE, INITIAL ENCOUNTER: ICD-10-CM
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
## 2025-05-31 LAB
B-HCG UR QL: NEGATIVE
CTP QC/QA: YES

## 2025-05-31 PROCEDURE — 25000003 PHARM REV CODE 250: Mod: ER

## 2025-05-31 PROCEDURE — 81025 URINE PREGNANCY TEST: CPT | Mod: ER

## 2025-05-31 PROCEDURE — 99284 EMERGENCY DEPT VISIT MOD MDM: CPT | Mod: 25,ER

## 2025-05-31 RX ORDER — LORAZEPAM 2 MG/ML
1 INJECTION INTRAMUSCULAR
Status: DISCONTINUED | OUTPATIENT
Start: 2025-05-31 | End: 2025-05-31

## 2025-05-31 RX ORDER — ALPRAZOLAM 1 MG/1
1 TABLET ORAL
Status: COMPLETED | OUTPATIENT
Start: 2025-05-31 | End: 2025-05-31

## 2025-05-31 RX ORDER — NAPROXEN 500 MG/1
500 TABLET ORAL 2 TIMES DAILY
Qty: 14 TABLET | Refills: 0 | Status: SHIPPED | OUTPATIENT
Start: 2025-05-31 | End: 2025-06-07

## 2025-05-31 RX ADMIN — ALPRAZOLAM 1 MG: 1 TABLET ORAL at 08:05
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
## 2025-06-01 NOTE — DISCHARGE INSTRUCTIONS
You were seen here today for face and shoulder pain. I believe this is most likely due to a bruising to the area after an injury. You may use ice for extra relief.  If you are having swelling to the area, be sure to elevated above your heart for additional relief.  You may feel discomfort or soreness to the area for several days to a week.      I recommend over the counter Tylenol (Acetaminophen) and/or Motrin (Ibuprofen) for additional control of pain. You can stagger the dosing so you are taking one or the other every three hours while spacing out the Tylenol and every 6 hours and the Motrin every 6 hours. HOWEVER, if you are taking another NSAID such as Ibuprofen, Meloxicam, Toradol, Celebrex, or others, DO NOT take Ibuprofen at the same time.     See discharge paperwork for more information on your diagnosis.  If you start to have any new or worsening symptoms, please return to the emergency room.    Thank you for allowing me and my emergency team to take care of you here today! I hope you feel better soon. Please do not hesitate to return with any additional concerns that may arise from this or any new problem you encounter.    Our goal in the emergency department is to always give you outstanding care and exceptional service. If you receive a survey by mail or e-mail in the next week regarding your experience in our ED, we would greatly appreciate you completing it. Your feedback provides us with a way to recognize our staff who give very good care and it helps us learn how to improve when your experience was below the excellence we aspire to be!    Brook Juneau, PA-C [facility], [facility] and [facility]   Emergency Room Physician Assistant

## 2025-06-01 NOTE — ED NOTES
Will CONNER here, with item + [id number] with officers [place] and [place]-pt returned from ct scan but unable to complete the face scan due to a panic attack.
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
